# Patient Record
Sex: FEMALE | Race: WHITE | URBAN - METROPOLITAN AREA
[De-identification: names, ages, dates, MRNs, and addresses within clinical notes are randomized per-mention and may not be internally consistent; named-entity substitution may affect disease eponyms.]

---

## 2022-03-18 PROBLEM — G43.109 MIGRAINE WITH VERTIGO: Status: ACTIVE | Noted: 2021-02-23

## 2022-03-19 PROBLEM — G43.101 MIGRAINE WITH AURA AND WITH STATUS MIGRAINOSUS, NOT INTRACTABLE: Status: ACTIVE | Noted: 2021-02-23

## 2022-06-10 ENCOUNTER — OFFICE VISIT (OUTPATIENT)
Dept: NEUROLOGY | Age: 42
End: 2022-06-10
Payer: COMMERCIAL

## 2022-06-10 VITALS — HEART RATE: 80 BPM | DIASTOLIC BLOOD PRESSURE: 82 MMHG | SYSTOLIC BLOOD PRESSURE: 118 MMHG

## 2022-06-10 DIAGNOSIS — G43.101 MIGRAINE WITH AURA AND WITH STATUS MIGRAINOSUS, NOT INTRACTABLE: Primary | ICD-10-CM

## 2022-06-10 PROCEDURE — 64615 CHEMODENERV MUSC MIGRAINE: CPT | Performed by: PSYCHIATRY & NEUROLOGY

## 2022-06-10 PROCEDURE — 99212 OFFICE O/P EST SF 10 MIN: CPT | Performed by: PSYCHIATRY & NEUROLOGY

## 2022-06-10 NOTE — PROGRESS NOTES
6/10/2022  Deandre Loop 39 y.o. female      Chief Complaint:  Chief Complaint   Patient presents with    Follow-up    Migraine     Botox injections          History of Present Illness:  Botox treatment worked well down to 1 moderate headache last week only. Today will be her fourth treatment. Dominant HA bilateral parietal, trapezius tightness. Has Bruxism. Review Test Results: I have reviewed imaging study and lab tests, non relevant. Current Outpatient Medications   Medication Sig Dispense Refill    buPROPion (WELLBUTRIN XL) 150 MG extended release tablet TAKE 1 TABLET BY MOUTH EVERY DAY IN THE MORNING      Cholecalciferol 50 MCG (2000 UT) TABS Take by mouth      citalopram (CELEXA) 20 MG tablet TAKE 1 TABLET BY MOUTH EVERY DAY      Levonorgest-Eth Estrad 91-Day 0.15-0.03 &0.01 MG TABS Take by mouth      loratadine (CLARITIN) 10 MG tablet TAKE 1 TABLET BY MOUTH EVERY DAY       Current Facility-Administered Medications   Medication Dose Route Frequency Provider Last Rate Last Admin    Onabotulinumtoxin A (BOTOX (COSMETIC)) injection 200 Units  200 Units IntraMUSCular Once Franklin Pino MD            Allergies   Allergen Reactions    Ace Inhibitors          Review of Systems:  Review of Systems   Neurological: Positive for headaches. Examination:  Vitals:    06/10/22 0755   BP: 118/82   Site: Left Upper Arm   Position: Sitting   Pulse: 80             Neurologic Exam      Botox Procedure Note    Indication: Intractable migraines. Consent: Written consent obtained after the potential risks and benefits have been explained to the patient. Potential risks include:  Pain, bruising, bleeding, infection, flu-like symptoms, over-weakening of injected or adjacent muscles and swallowing dysfunction. Patient was given the botulinum toxin medication guide according to FDA standards. Technique: Botox 200 unit was dissolved into non-preservative normal saline 4 ml.  Gauge 30 facial needles were used for the injections. Procedure: Total 179 units were injected, 21 units discarded/ unavoidable. Five units/ 0.1 ml per site unless specified. Occipitalis R3 L3. Cervical paraspinal R2 L2. Trapezius 7 units R3 L3. Temporalis R4 L4. Frontalis R2 L2. Procerus 1. Corrugators 3.5 units/ 0.07 ml R1 L1. Additional 0. Masseter 7.5 units R1 L1. Patient tolerated the procedure well. She was instructed to call for any discomfort related to the injection. Assessment / Plan:    Sharron Adhikari was seen today for follow-up and migraine. Diagnoses and all orders for this visit:    Migraine with aura and with status migrainosus, not intractable  -     66273 - Chemodenervation of muscle(s): innervated by facial, trigeminal, cervical spinal and accessory nerves, bilateral  -     Onabotulinumtoxin A (BOTOX (COSMETIC)) injection 200 Units    Patient responded to Botox therapy very well for her migraines and bruxism. Continue current regimen. I have spent 35 min, greater than 50% of discussing and counseling with patient, for treatment and diagnostic plan review.        Botox 200 units  NDC: 4508-7154-97  Lot: [id number]  Exp: 01/2025    Sodium Chloride 0.9%  Lot # [id number]  Exp: 11/01/2023  NDC: 9932-0807-66

## 2022-09-02 ENCOUNTER — OFFICE VISIT (OUTPATIENT)
Dept: NEUROLOGY | Age: 42
End: 2022-09-02
Payer: COMMERCIAL

## 2022-09-02 VITALS — WEIGHT: 147 LBS | DIASTOLIC BLOOD PRESSURE: 86 MMHG | HEART RATE: 69 BPM | SYSTOLIC BLOOD PRESSURE: 131 MMHG

## 2022-09-02 DIAGNOSIS — G43.711 INTRACTABLE CHRONIC MIGRAINE WITHOUT AURA AND WITH STATUS MIGRAINOSUS: ICD-10-CM

## 2022-09-02 DIAGNOSIS — G43.101 MIGRAINE WITH AURA AND WITH STATUS MIGRAINOSUS, NOT INTRACTABLE: Primary | ICD-10-CM

## 2022-09-02 PROBLEM — G43.111 INTRACTABLE MIGRAINE WITH AURA WITH STATUS MIGRAINOSUS: Status: ACTIVE | Noted: 2021-02-23

## 2022-09-02 PROCEDURE — 99214 OFFICE O/P EST MOD 30 MIN: CPT | Performed by: PSYCHIATRY & NEUROLOGY

## 2022-09-02 PROCEDURE — 64615 CHEMODENERV MUSC MIGRAINE: CPT | Performed by: PSYCHIATRY & NEUROLOGY

## 2022-09-02 NOTE — PROGRESS NOTES
9/2/2022  Avis Zee 43 y.o. female      Chief Complaint:  Chief Complaint   Patient presents with    Migraine     botox    Follow-up              History of Present Illness:  Botox treatment worked well down to 1 headaches per month but last week migraine increased. Today will be her fifth treatment. Dominant HA bilateral parietal, trapezius tightness. Has Bruxism. Review Test Results: I have reviewed imaging study and lab tests, none relevant. Current Outpatient Medications   Medication Sig Dispense Refill    Onabotulinumtoxin A (BOTOX, COSMETIC,) 200 units injection 200 units IntraMUSCular q 3 months 1 each 3    buPROPion (WELLBUTRIN XL) 150 MG extended release tablet TAKE 1 TABLET BY MOUTH EVERY DAY IN THE MORNING      Cholecalciferol 50 MCG (2000 UT) TABS Take by mouth      citalopram (CELEXA) 20 MG tablet TAKE 1 TABLET BY MOUTH EVERY DAY      Levonorgest-Eth Estrad 91-Day 0.15-0.03 &0.01 MG TABS Take by mouth      loratadine (CLARITIN) 10 MG tablet TAKE 1 TABLET BY MOUTH EVERY DAY       No current facility-administered medications for this visit. Allergies   Allergen Reactions    Ace Inhibitors          Review of Systems:  Review of Systems   HENT:          Clenching teeth   Neurological:  Positive for headaches. Examination:  Vitals:    09/02/22 1350   BP: 131/86   Pulse: 69   Weight: 147 lb (66.7 kg)        Physical Exam  Vitals reviewed. Constitutional:       Appearance: Normal appearance. She is normal weight. HENT:      Head: Normocephalic. Eyes:      Extraocular Movements: Extraocular movements intact. Conjunctiva/sclera: Conjunctivae normal.      Pupils: Pupils are equal, round, and reactive to light. Cardiovascular:      Rate and Rhythm: Normal rate. Pulmonary:      Effort: Pulmonary effort is normal.   Musculoskeletal:         General: Normal range of motion. Cervical back: Normal range of motion. Skin:     General: Skin is warm and dry.    Neurological: General: No focal deficit present. Mental Status: She is alert and oriented to person, place, and time. Mental status is at baseline. Cranial Nerves: No cranial nerve deficit. Sensory: No sensory deficit. Motor: No weakness. Coordination: Coordination normal.      Gait: Gait normal.   Psychiatric:         Mood and Affect: Mood normal.         Behavior: Behavior normal.         Thought Content: Thought content normal.         Judgment: Judgment normal.        Neurologic Exam     Mental Status   Oriented to person, place, and time. Cranial Nerves     CN III, IV, VI   Pupils are equal, round, and reactive to light. Botox Procedure Note    Indication: Intractable migraines. Consent: Written consent obtained after the potential risks and benefits have been explained to the patient. Potential risks include:  Pain, bruising, bleeding, infection, flu-like symptoms, over-weakening of injected or adjacent muscles and swallowing dysfunction. Patient was given the botulinum toxin medication guide according to FDA standards. Technique: Botox A 200 unit was dissolved into non-preservative normal saline 4 ml. Gauge 30 facial needles were used for the injection. Procedure: Total 179 units were injected, 21 units discarded/ unavoidable. Five units/ 0.1 ml per site unless specified. Occipitalis R3 L3. Cervical paraspinal R2 L2. Trapezius 7 units R3 L3. Temporalis R4 L4. Frontalis R2 L2. Procerus 1. Corrugators 3.5 units/ 0.07 ml R1 L1. Additional 0. Masseter 7.5 units R1 L1. Patient tolerated the procedure well. She was instructed to call for any discomfort related to the injection. Assessment / Plan:    Dilcia Piper was seen today for migraine and follow-up.     Diagnoses and all orders for this visit:    Migraine with aura and with status migrainosus, not intractable  -     Onabotulinumtoxin A (BOTOX (COSMETIC)) injection 200 Units    Intractable chronic migraine without aura and with status migrainosus  -     Onabotulinumtoxin A (BOTOX, COSMETIC,) 200 units injection; 200 units IntraMUSCular q 3 months  -     36054 - Chemodenervation of muscle(s): innervated by facial, trigeminal, cervical spinal and accessory nerves, bilateral     Patient has been doing very well with Botox therapy, taking much less ibuprofen. Continue current regimen. I have spent 30 min, greater than 50% of discussing and counseling with patient, for treatment and diagnostic plan review.

## 2022-12-02 ENCOUNTER — OFFICE VISIT (OUTPATIENT)
Dept: NEUROLOGY | Age: 42
End: 2022-12-02

## 2022-12-02 VITALS — DIASTOLIC BLOOD PRESSURE: 83 MMHG | SYSTOLIC BLOOD PRESSURE: 126 MMHG | HEART RATE: 72 BPM

## 2022-12-02 DIAGNOSIS — G43.711 INTRACTABLE CHRONIC MIGRAINE WITHOUT AURA AND WITH STATUS MIGRAINOSUS: Primary | ICD-10-CM

## 2022-12-02 ASSESSMENT — ENCOUNTER SYMPTOMS: PHOTOPHOBIA: 1

## 2022-12-02 NOTE — LETTER
Informed Consent for Botulinumtoxina  (Botox)                                                                         [facility]#[id number]    Patient Name: Humberto Siu             :_1980      I give consent for the following procedure(s)    Botulinumtoxina (Botox) injections to be performed by:    []   Dr. Sandy Rivera    []   Dr. Oniel Dean    [x]   Dr. Sukumar Juarez    I understand that my provider will have the main responsibility for my care specific to procedure. I understand the the doctor may be fellows, residents or other qualified first assistant's may be involved in my procedure under the supervision of the above provider. My provider has explained to me my condition, the nature and purpose of each procedure, and the risks and types of complications involved, the potential benefits and drawbacks, potential problems related to recovery, the likelihood of success, the possible results of non-treatment,and the reasonable options or alternatives. I have had the chance to ask questions about the planned procedure and all my questions have been answered to my satisfaction. I have received no promises from anyone about the results that may be obtained from the procedure. All procedures involve risk. Routine risks include, but are not limited to, perforation and/or injury to nearby blood vessels, nerve and/or organs, infection, blood clots. Other risks may include paralysis and/or _____________________________________. I understand that the information I have received about the risks may not be fully completed and other less common risks.       ________________________________________Date:_______________  Patient Signature    ________________________________________Date:_______________  Provider Signature                         _______________________________________ Date:_______________             Witness Signature

## 2022-12-02 NOTE — PROGRESS NOTES
12/2/2022  Jaylon Nguyen 43 y.o. female      Chief Complaint:  Chief Complaint   Patient presents with    Migraine     Botox          History of Present Illness:  Botox treatment worked well down to 0-1 headaches per month. Today will be her sixth treatment. Dominant HA bilateral parietal, trapezius tightness. Has Bruxism. Review Test Results: I have reviewed imaging study and lab tests, non relevant recently. Current Outpatient Medications   Medication Sig Dispense Refill    Onabotulinumtoxin A (BOTOX, COSMETIC,) 200 units injection 200 units IntraMUSCular q 3 months 1 each 3    buPROPion (WELLBUTRIN XL) 150 MG extended release tablet TAKE 1 TABLET BY MOUTH EVERY DAY IN THE MORNING      Cholecalciferol 50 MCG (2000 UT) TABS Take by mouth      citalopram (CELEXA) 20 MG tablet TAKE 1 TABLET BY MOUTH EVERY DAY      Levonorgest-Eth Estrad 91-Day 0.15-0.03 &0.01 MG TABS Take by mouth      loratadine (CLARITIN) 10 MG tablet TAKE 1 TABLET BY MOUTH EVERY DAY       No current facility-administered medications for this visit. Allergies   Allergen Reactions    Ace Inhibitors          Review of Systems:  Review of Systems   Eyes:  Positive for photophobia. Neurological:  Positive for headaches. Examination:  Vitals:    12/02/22 0939   BP: 126/83   Site: Left Upper Arm   Position: Sitting   Pulse: 72        Physical Exam  Vitals reviewed. Constitutional:       Appearance: She is normal weight. Eyes:      Extraocular Movements: Extraocular movements intact. Conjunctiva/sclera: Conjunctivae normal.      Pupils: Pupils are equal, round, and reactive to light. Cardiovascular:      Rate and Rhythm: Normal rate. Pulmonary:      Effort: Pulmonary effort is normal.   Musculoskeletal:         General: Normal range of motion. Cervical back: Normal range of motion. Skin:     General: Skin is warm and dry. Neurological:      General: No focal deficit present.       Mental Status: She is alert and oriented to person, place, and time. Mental status is at baseline. Sensory: No sensory deficit. Motor: No weakness. Coordination: Coordination normal.      Gait: Gait normal.   Psychiatric:         Mood and Affect: Mood normal.         Behavior: Behavior normal.         Thought Content: Thought content normal.         Judgment: Judgment normal.        Neurologic Exam     Mental Status   Oriented to person, place, and time. Cranial Nerves     CN III, IV, VI   Pupils are equal, round, and reactive to light. Botox Procedure Note    Indication: Intractable migraines. Consent: Written consent obtained after the potential risks and benefits have been explained to the patient. Potential risks include:  Pain, bruising, bleeding, infection, flu-like symptoms, over-weakening of injected or adjacent muscles and swallowing dysfunction. Patient was given the botulinum toxin medication guide according to FDA standards. Technique: Botox A 200 unit was dissolved into non-preservative normal saline 4 ml. Gauge 30 facial needles were used for the injection. Procedure: Total 179 units were injected, 21 units discarded/ unavoidable. Five units/ 0.1 ml per site unless specified. Occipitalis R3 L3. Cervical paraspinal R2 L2. Trapezius 7 units R3 L3. Temporalis R4 L4. Frontalis R2 L2. Procerus 1. Corrugators 3.5 units/ 0.07 ml R1 L1. Additional 0. Masseter 7.5 units R1 L1. Patient tolerated the procedure well. She was instructed to call for any discomfort related to the injection. Assessment / Plan:    Sofia was seen today for migraine.     Diagnoses and all orders for this visit:    Intractable chronic migraine without aura and with status migrainosus  -     Onabotulinumtoxin A (BOTOX) injection 200 Units  -     83376 - Chemodenervation of muscle(s): innervated by facial, trigeminal, cervical spinal and accessory nerves, bilateral     Patient has responded to Botox therapy very well.  Continue current regimen. I have spent 30 min, greater than 50% of discussing and counseling with patient, for treatment and diagnostic plan review.

## 2023-02-24 ENCOUNTER — OFFICE VISIT (OUTPATIENT)
Dept: NEUROLOGY | Age: 43
End: 2023-02-24
Payer: COMMERCIAL

## 2023-02-24 VITALS — DIASTOLIC BLOOD PRESSURE: 78 MMHG | SYSTOLIC BLOOD PRESSURE: 123 MMHG | OXYGEN SATURATION: 96 % | HEART RATE: 68 BPM

## 2023-02-24 DIAGNOSIS — G43.711 INTRACTABLE CHRONIC MIGRAINE WITHOUT AURA AND WITH STATUS MIGRAINOSUS: Primary | ICD-10-CM

## 2023-02-24 PROCEDURE — 96372 THER/PROPH/DIAG INJ SC/IM: CPT | Performed by: PSYCHIATRY & NEUROLOGY

## 2023-02-24 PROCEDURE — 99212 OFFICE O/P EST SF 10 MIN: CPT | Performed by: PSYCHIATRY & NEUROLOGY

## 2023-02-24 PROCEDURE — 64615 CHEMODENERV MUSC MIGRAINE: CPT | Performed by: PSYCHIATRY & NEUROLOGY

## 2023-02-24 NOTE — PROGRESS NOTES
2/24/2023  Nicolasa Cassette 43 y.o. female      Chief Complaint:  Chief Complaint   Patient presents with    Follow-up     Botox injection    Migraine    Procedure          History of Present Illness:  Botox treatment worked well down to 0-1 headaches per month. Today will be her seventh treatment. Regular exercise. Previous Dominant HA bilateral parietal, trapezius tightness. Has Bruxism. Review Test Results: I have reviewed imaging study and lab tests, none relevant. Current Outpatient Medications   Medication Sig Dispense Refill    Onabotulinumtoxin A (BOTOX, COSMETIC,) 200 units injection 200 units IntraMUSCular q 3 months 1 each 3    buPROPion (WELLBUTRIN XL) 150 MG extended release tablet TAKE 1 TABLET BY MOUTH EVERY DAY IN THE MORNING      Cholecalciferol 50 MCG (2000 UT) TABS Take by mouth      citalopram (CELEXA) 20 MG tablet TAKE 1 TABLET BY MOUTH EVERY DAY      Levonorgest-Eth Estrad 91-Day 0.15-0.03 &0.01 MG TABS Take by mouth      loratadine (CLARITIN) 10 MG tablet TAKE 1 TABLET BY MOUTH EVERY DAY       No current facility-administered medications for this visit. Allergies   Allergen Reactions    Ace Inhibitors          Review of Systems:  Review of Systems   Neurological:  Positive for headaches. Examination:  Vitals:    02/24/23 1337   BP: 123/78   Pulse: 68   SpO2: 96%        Physical Exam  Vitals reviewed. Constitutional:       Appearance: She is normal weight. Eyes:      Extraocular Movements: Extraocular movements intact. Conjunctiva/sclera: Conjunctivae normal.      Pupils: Pupils are equal, round, and reactive to light. Cardiovascular:      Rate and Rhythm: Normal rate. Pulmonary:      Effort: Pulmonary effort is normal.   Musculoskeletal:         General: Normal range of motion. Cervical back: Normal range of motion. Skin:     General: Skin is warm and dry. Neurological:      General: No focal deficit present.       Mental Status: She is alert and oriented to person, place, and time. Mental status is at baseline. Cranial Nerves: No cranial nerve deficit. Sensory: No sensory deficit. Motor: No weakness. Coordination: Coordination normal.      Gait: Gait normal.   Psychiatric:         Mood and Affect: Mood normal.         Behavior: Behavior normal.         Thought Content: Thought content normal.         Judgment: Judgment normal.        Neurologic Exam     Mental Status   Oriented to person, place, and time. Cranial Nerves     CN III, IV, VI   Pupils are equal, round, and reactive to light. Botox Procedure Note    Indication: Intractable migraines. Consent: Written consent obtained after the potential risks and benefits have been explained to the patient. Potential risks include:  Pain, bruising, bleeding, infection, flu-like symptoms, over-weakening of injected or adjacent muscles and swallowing dysfunction. Patient was given the botulinum toxin medication guide according to FDA standards. Technique: Botox A 200 unit was dissolved into non-preservative normal saline 4 ml. Gauge 30 facial needles were used for the injection. Procedure: Total 179 units were injected, 21 units discarded/ unavoidable. Five units/ 0.1 ml per site unless specified. Occipitalis R3 L3. Cervical paraspinal R2 L2. Trapezius 7 units R3 L3. Temporalis R4 L4. Frontalis R2 L2. Procerus 1. Corrugators 3.5 units/ 0.07 ml R1 L1. Additional 0. Masseter 7.5 units R1 L1. Patient tolerated the procedure well. She was instructed to call for any discomfort related to the injection. Assessment / Plan:    Nery Chavez was seen today for follow-up, migraine and procedure.     Diagnoses and all orders for this visit:    Intractable chronic migraine without aura and with status migrainosus  -     Onabotulinumtoxin A (BOTOX) injection 200 Units  -     59578 - Chemodenervation of muscle(s): innervated by facial, trigeminal, cervical spinal and accessory nerves, bilateral     Responded to Botox therapy very well for chronic migraine and bruxism. Continue current regimen. I have spent 30 min, greater than 50% of discussing and counseling with patient, for treatment and diagnostic plan review.

## 2023-02-24 NOTE — LETTER
Informed Consent for Botulinumtoxina  (Botox)                                                                  [facility]#_[id number]    Patient Name: Fara East             :_1980    I give consent for the following procedure(s)  Botulinumtoxina (Botox) injections to be performed by:    []   Dr. Paty Anthony    []   Nicki Simon NP    []   Dr. Alicia Levine    [x]   Dr. Daxa Roman    I understand that my provider will have the main responsibility for my care specific to procedure. I understand the the doctor may be fellows, residents or other qualified first assistant's may be involved in my procedure under the supervision of the above provider. My provider has explained to me my condition, the nature and purpose of each procedure, and the risks and types of complications involved, the potential benefits and drawbacks, potential problems related to recovery, the likelihood of success, the possible results of non-treatment,and the reasonable options or alternatives. I have had the chance to ask questions about the planned procedure and all my questions have been answered to my satisfaction. I have received no promises from anyone about the results that may be obtained from the procedure. All procedures involve risk. Routine risks include, but are not limited to, perforation and/or injury to nearby blood vessels, nerve and/or organs, infection, blood clots. Other risks may include paralysis and/or _____________________________________. I understand that the information I have received about the risks may not be fully completed and other less common risks.     ________________________________________Date:_______________  Patient Signature    ________________________________________Date:_______________  Provider Signature                         _______________________________________ Date:_______________             Witness Signature

## 2023-02-24 NOTE — PROGRESS NOTES
2/24/2023  April Still 42 y.o. female      Chief Complaint:  Chief Complaint   Patient presents with    Follow-up     Botox injection          History of Present Illness:  Botox treatment worked well down to 0-1 headaches per month. Today will be her seventh treatment.    Regular exercise. Previous Dominant HA bilateral parietal, trapezius tightness. Has Bruxism.    Review Test Results: I have reviewed imaging study and lab tests, none relevant.       Current Outpatient Medications   Medication Sig Dispense Refill    Onabotulinumtoxin A (BOTOX, COSMETIC,) 200 units injection 200 units IntraMUSCular q 3 months 1 each 3    buPROPion (WELLBUTRIN XL) 150 MG extended release tablet TAKE 1 TABLET BY MOUTH EVERY DAY IN THE MORNING      Cholecalciferol 50 MCG (2000 UT) TABS Take by mouth      citalopram (CELEXA) 20 MG tablet TAKE 1 TABLET BY MOUTH EVERY DAY      Levonorgest-Eth Estrad 91-Day 0.15-0.03 &0.01 MG TABS Take by mouth      loratadine (CLARITIN) 10 MG tablet TAKE 1 TABLET BY MOUTH EVERY DAY       No current facility-administered medications for this visit.        Allergies   Allergen Reactions    Ace Inhibitors          Review of Systems:  Review of Systems   Neurological:  Positive for headaches.       Examination:  Vitals:    02/24/23 1337   BP: 123/78   Pulse: 68   SpO2: 96%        Physical Exam  Vitals reviewed.   Constitutional:       Appearance: She is normal weight.   Eyes:      Extraocular Movements: Extraocular movements intact.      Conjunctiva/sclera: Conjunctivae normal.      Pupils: Pupils are equal, round, and reactive to light.   Cardiovascular:      Rate and Rhythm: Normal rate.   Pulmonary:      Effort: Pulmonary effort is normal.   Musculoskeletal:         General: Normal range of motion.      Cervical back: Normal range of motion.   Skin:     General: Skin is warm and dry.   Neurological:      General: No focal deficit present.      Mental Status: She is alert and oriented to person, place,  and time. Mental status is at baseline. Cranial Nerves: No cranial nerve deficit. Sensory: No sensory deficit. Motor: No weakness. Coordination: Coordination normal.      Gait: Gait normal.   Psychiatric:         Mood and Affect: Mood normal.         Behavior: Behavior normal.         Thought Content: Thought content normal.         Judgment: Judgment normal.        Neurologic Exam     Mental Status   Oriented to person, place, and time. Cranial Nerves     CN III, IV, VI   Pupils are equal, round, and reactive to light. Botox Procedure Note    Indication: Intractable migraines. Consent: Written consent obtained after the potential risks and benefits have been explained to the patient. Potential risks include:  Pain, bruising, bleeding, infection, flu-like symptoms, over-weakening of injected or adjacent muscles and swallowing dysfunction. Patient was given the botulinum toxin medication guide according to FDA standards. Technique: Botox A 200 unit was dissolved into non-preservative normal saline 4 ml. Gauge 30 facial needles were used for the injection. Procedure: Total 179 units were injected, 21 units discarded/ unavoidable. Five units/ 0.1 ml per site unless specified. Occipitalis R3 L3. Cervical paraspinal R2 L2. Trapezius 7 units R3 L3. Temporalis R4 L4. Frontalis R2 L2. Procerus 1. Corrugators 3.5 units/ 0.07 ml R1 L1. Additional 0. Masseter 7.5 units R1 L1. Patient tolerated the procedure well. She was instructed to call for any discomfort related to the injection. Assessment / Plan:    Warden Huerta was seen today for follow-up.     Diagnoses and all orders for this visit:    Intractable chronic migraine without aura and with status migrainosus  -     Onabotulinumtoxin A (BOTOX) injection 200 Units  -     43850 - Chemodenervation of muscle(s): innervated by facial, trigeminal, cervical spinal and accessory nerves, bilateral     Responded to Botox therapy very well for chronic migraine and bruxism. Continue current regimen. I have spent 30 min, greater than 50% of discussing and counseling with patient, for treatment and diagnostic plan review.

## 2023-05-22 ENCOUNTER — OFFICE VISIT (OUTPATIENT)
Dept: NEUROLOGY | Age: 43
End: 2023-05-22
Payer: COMMERCIAL

## 2023-05-22 VITALS — OXYGEN SATURATION: 98 % | HEART RATE: 84 BPM | SYSTOLIC BLOOD PRESSURE: 118 MMHG | DIASTOLIC BLOOD PRESSURE: 89 MMHG

## 2023-05-22 DIAGNOSIS — G43.711 INTRACTABLE CHRONIC MIGRAINE WITHOUT AURA AND WITH STATUS MIGRAINOSUS: Primary | ICD-10-CM

## 2023-05-22 PROCEDURE — 64615 CHEMODENERV MUSC MIGRAINE: CPT

## 2023-05-22 NOTE — PROGRESS NOTES
[facility], [address] [place] [address], [address] [place] [address]  Phone: [phone] Fax [phone]  KINJAL Mike          Patient: Madison Winn  Provider: KINJAL Mike       Procedure note: Botulinum Toxin injections     Indication: Chronic Migraine        Subjective:      Patient present for chemodenervation for chronic migraine. The patient received her last Botox injections in February 2023 with Dr. Suzanna Jordan. The patient was referred by Dr. Suzanna Jordan. These notes have been reviewed. The patient reports no more than 4-5 headache days since last treatment. The treatments have significantly reduced the frequency and severity of headaches. According to a review of the patient's medical record, this will the the patient's eighth treatment. She reports that her headaches are primarily located in bilateral parietal regions. Symptoms reported to be associated with headaches include nausea, photophobia, and phonophobia. In addition, tightness in bilateral trapezius muscles. Also treated for bruxism. The patient has previously tolerated the injections without difficulty or adverse effects. She continues to take OTC options for breakthrough headaches. Past medical history, surgical history, social history, family history, medications and allergies were all reviewed and updated as appropriate.       Outpatient Encounter Medications as of 5/22/2023   Medication Sig Dispense Refill    Onabotulinumtoxin A (BOTOX, COSMETIC,) 200 units injection 200 units IntraMUSCular q 3 months 1 each 3    buPROPion (WELLBUTRIN XL) 150 MG extended release tablet TAKE 1 TABLET BY MOUTH EVERY DAY IN THE MORNING      Cholecalciferol 50 MCG (2000 UT) TABS Take by mouth      citalopram (CELEXA) 20 MG tablet TAKE 1 TABLET BY MOUTH EVERY DAY      Levonorgest-Eth Estrad 91-Day 0.15-0.03 &0.01 MG TABS Take by mouth      loratadine (CLARITIN) 10 MG tablet TAKE 1 TABLET BY MOUTH EVERY DAY       No

## 2023-08-28 ENCOUNTER — OFFICE VISIT (OUTPATIENT)
Dept: NEUROLOGY | Age: 43
End: 2023-08-28
Payer: COMMERCIAL

## 2023-08-28 VITALS — DIASTOLIC BLOOD PRESSURE: 78 MMHG | HEART RATE: 68 BPM | SYSTOLIC BLOOD PRESSURE: 116 MMHG

## 2023-08-28 DIAGNOSIS — G43.711 INTRACTABLE CHRONIC MIGRAINE WITHOUT AURA AND WITH STATUS MIGRAINOSUS: Primary | ICD-10-CM

## 2023-08-28 PROCEDURE — 64615 CHEMODENERV MUSC MIGRAINE: CPT

## 2023-08-28 NOTE — PROGRESS NOTES
[facility]2020 [facility], [address]  Phone: [phone] Fax [phone]  KINJAL Robles          Patient: Karen Patterson  Provider: KINJAL Robles       Procedure note: Botulinum Toxin injections     Indication: Chronic Migraine        Subjective:      Patient present for chemodenervation for chronic migraine. The patient received her last Botox injections in May 2023. These notes have been reviewed. The patient was referred by Dr. Monique Valles. Moving forward she will follow Laura Quiroz NP for medication management. The treatments have significantly reduced the frequency and severity of headaches. According to a review of the patient's medical record, this will the the patient's 9th  treatment. She reports that her headaches are primarily located in bilateral parietal regions. Symptoms reported to be associated with headaches include nausea, photophobia, and phonophobia. In addition, tightness in bilateral trapezius muscles. Also treated for bruxism. The patient has previously tolerated the injections without difficulty or adverse effects. She continues to take OTC options for breakthrough headaches. Past medical history, surgical history, social history, family history, medications and allergies were all reviewed and updated as appropriate.       Outpatient Encounter Medications as of 2023   Medication Sig Dispense Refill    Onabotulinumtoxin A (BOTOX, COSMETIC,) 200 units injection 200 units IntraMUSCular q 3 months 1 each 3    buPROPion (WELLBUTRIN XL) 150 MG extended release tablet TAKE 1 TABLET BY MOUTH EVERY DAY IN THE MORNING      Cholecalciferol 50 MCG ( UT) TABS Take by mouth      citalopram (CELEXA) 20 MG tablet TAKE 1 TABLET BY MOUTH EVERY DAY      Levonorgest-Eth Estrad -Day 0.15-0.03 &0.01 MG TABS Take by mouth      loratadine (CLARITIN) 10 MG tablet TAKE 1 TABLET BY MOUTH EVERY DAY      [] Onabotulinumtoxin A

## 2023-11-20 ENCOUNTER — OFFICE VISIT (OUTPATIENT)
Dept: NEUROLOGY | Age: 43
End: 2023-11-20
Payer: COMMERCIAL

## 2023-11-20 VITALS — SYSTOLIC BLOOD PRESSURE: 136 MMHG | OXYGEN SATURATION: 95 % | HEART RATE: 68 BPM | DIASTOLIC BLOOD PRESSURE: 98 MMHG

## 2023-11-20 DIAGNOSIS — G43.711 INTRACTABLE CHRONIC MIGRAINE WITHOUT AURA AND WITH STATUS MIGRAINOSUS: Primary | ICD-10-CM

## 2023-11-20 PROCEDURE — 64615 CHEMODENERV MUSC MIGRAINE: CPT

## 2023-11-20 NOTE — PROGRESS NOTES
[facility]2020 e E, [address]  Phone: [phone] Fax [phone]  KINJAL Olmedo          Patient: Madhuri Brenner  Provider: KINJAL Olmedo       Procedure note: Botulinum Toxin injections     Indication: Chronic Migraine        Subjective:      Patient present for chemodenervation for chronic migraine. The patient received her last Botox injections in 2023. These notes have been reviewed. The treatments have significantly reduced the frequency and severity of headaches. According to a review of the patient's medical record, this will the the patient's 10th  treatment. She reports that her headaches are primarily located in bilateral parietal regions. Symptoms reported to be associated with headaches include nausea, photophobia, and phonophobia. In addition, tightness in bilateral trapezius muscles. Also treated for bruxism. The patient has previously tolerated the injections without difficulty or adverse effects. She continues to take OTC options for breakthrough headaches. Past medical history, surgical history, social history, family history, medications and allergies were all reviewed and updated as appropriate. Outpatient Encounter Medications as of 2023   Medication Sig Dispense Refill    Onabotulinumtoxin A (BOTOX, COSMETIC,) 200 units injection 200 units IntraMUSCular q 3 months 1 each 3    buPROPion (WELLBUTRIN XL) 150 MG extended release tablet TAKE 1 TABLET BY MOUTH EVERY DAY IN THE MORNING      Cholecalciferol 50 MCG ( UT) TABS Take by mouth      citalopram (CELEXA) 20 MG tablet TAKE 1 TABLET BY MOUTH EVERY DAY      Levonorgest-Eth Estrad -Day 0.15-0.03 &0.01 MG TABS Take by mouth      loratadine (CLARITIN) 10 MG tablet TAKE 1 TABLET BY MOUTH EVERY DAY      [] Onabotulinumtoxin A (BOTOX) injection 200 Units        No facility-administered encounter medications on file as of 2023.

## 2024-02-12 ENCOUNTER — OFFICE VISIT (OUTPATIENT)
Dept: NEUROLOGY | Age: 44
End: 2024-02-12
Payer: COMMERCIAL

## 2024-02-12 VITALS — SYSTOLIC BLOOD PRESSURE: 121 MMHG | OXYGEN SATURATION: 97 % | HEART RATE: 87 BPM | DIASTOLIC BLOOD PRESSURE: 80 MMHG

## 2024-02-12 DIAGNOSIS — G43.711 INTRACTABLE CHRONIC MIGRAINE WITHOUT AURA AND WITH STATUS MIGRAINOSUS: Primary | ICD-10-CM

## 2024-02-12 PROCEDURE — 64615 CHEMODENERV MUSC MIGRAINE: CPT

## 2024-02-12 NOTE — PROGRESS NOTES
[facility] NEUROLOG[facility]  [address] [place] [address], [address]  [place] [address]  Phone: [phone] Fax [phone]  KINJAL Guardado          Patient: April Still  Provider: KINJAL Guardado       Procedure note:  Botulinum Toxin injections     Indication: Chronic Migraine        Subjective:      Patient present for chemodenervation for chronic migraine. The patient received her last Botox injections in 2023. These notes have been reviewed.     The treatments have significantly reduced the frequency and severity of headaches.The patient has previously tolerated the injections without difficulty or adverse effects.    She reports that her headaches are primarily located in bilateral parietal regions. Symptoms reported to be associated with headaches include nausea, photophobia, and phonophobia. In addition, tightness in bilateral trapezius muscles. Also treated for bruxism.    She continues to take OTC options for breakthrough headaches.        Past medical history, surgical history, social history, family history, medications and allergies were all reviewed and updated as appropriate.      Outpatient Encounter Medications as of 2024   Medication Sig Dispense Refill    Onabotulinumtoxin A (BOTOX, COSMETIC,) 200 units injection 200 units IntraMUSCular q 3 months 1 each 3    buPROPion (WELLBUTRIN XL) 150 MG extended release tablet TAKE 1 TABLET BY MOUTH EVERY DAY IN THE MORNING      Cholecalciferol 50 MCG ( UT) TABS Take by mouth      citalopram (CELEXA) 20 MG tablet TAKE 1 TABLET BY MOUTH EVERY DAY      Levonorgest-Eth Estrad -Day 0.15-0.03 &0.01 MG TABS Take by mouth      loratadine (CLARITIN) 10 MG tablet TAKE 1 TABLET BY MOUTH EVERY DAY      [] Onabotulinumtoxin A (BOTOX) injection 200 Units        No facility-administered encounter medications on file as of 2024.           Exam:      Visit Vitals  Vitals:    24 1326   BP: 121/80   Pulse: 87   SpO2: 97%

## 2024-05-05 NOTE — PROGRESS NOTES
Corrugators (3.5 units) R1 L1.    Masseter R1 L1.  Lateral Pterygoid R1 L1.           Total injected: 187 BOTOX 100units/2 cc.   Waste: 13 units     Comments: There were no complications.  The patient tolerated the procedure well.        Signature: Annabelle Parrish NP-BELLE    Date: 05/06/24    [address], [address] [place] [address]  Ph: [phone]  Fax: [phone]       I have personally interviewed and examined April Still and I have personally reviewed all relevant records including labs and imaging as noted above. I have written all aspects of this note. More than 50% of this time was used for counseling regarding my diagnosis, prognosis, and plans for management.   Procedure Time: 25 minutes  Total visit time (including procedures): 30 minutes

## 2024-05-06 ENCOUNTER — OFFICE VISIT (OUTPATIENT)
Dept: NEUROLOGY | Age: 44
End: 2024-05-06

## 2024-05-06 VITALS — DIASTOLIC BLOOD PRESSURE: 80 MMHG | OXYGEN SATURATION: 98 % | SYSTOLIC BLOOD PRESSURE: 128 MMHG | HEART RATE: 89 BPM

## 2024-05-06 DIAGNOSIS — G43.711 INTRACTABLE CHRONIC MIGRAINE WITHOUT AURA AND WITH STATUS MIGRAINOSUS: Primary | ICD-10-CM

## 2024-07-28 NOTE — PROGRESS NOTES
[facility] NEUROLOGY  [address] [place] [address], [address]  [place] [address]  Phone: [phone] Fax [phone]  KINJAL Guardado          Patient: April Still  Provider: KINJAL Guardado       Procedure note:  Botulinum Toxin injections     Indication: Chronic Migraine        Subjective:      Patient present for chemodenervation for chronic migraine. The patient received her last Botox injections in May 2024. These notes have been reviewed. Has not established with provider for medication management-discussed.    The treatments have significantly reduced the frequency and severity of headaches.The patient has previously tolerated the injections without difficulty or adverse effects.    She reports that her headaches are primarily located in bilateral parietal regions. Symptoms reported to be associated with headaches include nausea, photophobia, and phonophobia. In addition, tightness in bilateral trapezius muscles. Also treated for bruxism.    She continues to take OTC options for breakthrough headaches.        Past medical history, surgical history, social history, family history, medications and allergies were all reviewed and updated as appropriate.      Outpatient Encounter Medications as of 2024   Medication Sig Dispense Refill    Onabotulinumtoxin A (BOTOX, COSMETIC,) 200 units injection 200 units IntraMUSCular q 3 months 1 each 3    buPROPion (WELLBUTRIN XL) 150 MG extended release tablet TAKE 1 TABLET BY MOUTH EVERY DAY IN THE MORNING (Patient not taking: Reported on 2024)      Cholecalciferol 50 MCG (2000) TABS Take by mouth      citalopram (CELEXA) 20 MG tablet TAKE 1 TABLET BY MOUTH EVERY DAY      Levonorgest-Eth Estrad -Day 0.15-0.03 &0.01 MG TABS Take by mouth      loratadine (CLARITIN) 10 MG tablet TAKE 1 TABLET BY MOUTH EVERY DAY      [] Onabotulinumtoxin A (BOTOX) injection 200 Units        No facility-administered encounter medications on file as of

## 2024-07-29 ENCOUNTER — OFFICE VISIT (OUTPATIENT)
Dept: NEUROLOGY | Age: 44
End: 2024-07-29
Payer: COMMERCIAL

## 2024-07-29 VITALS — SYSTOLIC BLOOD PRESSURE: 121 MMHG | OXYGEN SATURATION: 96 % | HEART RATE: 71 BPM | DIASTOLIC BLOOD PRESSURE: 75 MMHG

## 2024-07-29 DIAGNOSIS — G43.711 INTRACTABLE CHRONIC MIGRAINE WITHOUT AURA AND WITH STATUS MIGRAINOSUS: Primary | ICD-10-CM

## 2024-07-29 PROCEDURE — 64615 CHEMODENERV MUSC MIGRAINE: CPT

## 2024-10-25 ENCOUNTER — OFFICE VISIT (OUTPATIENT)
Dept: NEUROLOGY | Age: 44
End: 2024-10-25

## 2024-10-25 VITALS — HEART RATE: 76 BPM | OXYGEN SATURATION: 96 % | SYSTOLIC BLOOD PRESSURE: 115 MMHG | DIASTOLIC BLOOD PRESSURE: 80 MMHG

## 2024-10-25 DIAGNOSIS — F45.8 BRUXISM: ICD-10-CM

## 2024-10-25 DIAGNOSIS — G43.711 INTRACTABLE CHRONIC MIGRAINE WITHOUT AURA AND WITH STATUS MIGRAINOSUS: Primary | ICD-10-CM

## 2024-10-25 NOTE — PROGRESS NOTES
[facility]  [address] [place] [address], [address]  [place] [address]  Phone: [phone] Fax [phone]       Botulinum Toxin Injection Procedure Note    Pre-procedure Diagnosis: Chronic migraine headaches, bruxism      Patient presents for chemodenervation for chronic migraine. She received treatment for about 2 years first with Dr. CAMILO then with Oumou Parrish NP.   Last treatment in July 2024.     Denies side effects and has had a decrease in migraine frequency and intensity    Indications: same  Improving with decreased intensity of migraines.       Past medical history, surgical history, social history, family history, medications and allergies were all reviewed and updated as appropriate.     Procedure Details   Consent: Written consent obtained after the potential risks and benefits have been explained to the patient.  Potential risks include:  Pain, bruising, bleeding, infection, flu-like symptoms, over-weakening of injected or adjacent muscles and swallowing dysfunction. Patient was given the botulinum toxin medication guide according to FDA standards.    Technique: Botox A 200 unit was dissolved into non-preservative normal saline 4 ml. Gauge 30 facial needles were used for the injection.     Bilateral Corrugators: 5 units each side (total 10 units)  Procerus: 5 units  Bilateral frontalis: 5 units at 4 sites ( total 20 units)   Bilateral temporalis: 20 units divided into 2 sites each side (total 40 units)  Bilateral occipitalis: 15 units divided into 2 sites each side (total 30 units)  Bilateral cervical paraspinals: 10 units divided into 2 ites each side (total 20 units)   Bilateral trapezius: 15 units divided into 3 sites, each side ( total 30 units)     Total injected: 165 BOTOX 100units/2 cc.   Waste: 35 units     Comments: There were no complications.  The patient tolerated the procedure well.     - RTC 3 months for review and re-injection.      Signature: MD Nat Weller

## 2025-01-21 ENCOUNTER — OFFICE VISIT (OUTPATIENT)
Dept: NEUROLOGY | Age: 45
End: 2025-01-21
Payer: COMMERCIAL

## 2025-01-21 VITALS — HEART RATE: 75 BPM | DIASTOLIC BLOOD PRESSURE: 80 MMHG | SYSTOLIC BLOOD PRESSURE: 118 MMHG

## 2025-01-21 DIAGNOSIS — G43.711 INTRACTABLE CHRONIC MIGRAINE WITHOUT AURA AND WITH STATUS MIGRAINOSUS: Primary | ICD-10-CM

## 2025-01-21 PROCEDURE — 64615 CHEMODENERV MUSC MIGRAINE: CPT | Performed by: PSYCHIATRY & NEUROLOGY

## 2025-01-21 NOTE — PROGRESS NOTES
[facility]  [address] [place] [address], [address]  [place] [address]  Phone: [phone] Fax [phone]       Botulinum Toxin Injection Procedure Note    Pre-procedure Diagnosis: Chronic migraine headaches     Patient presents for chemodenervation for chronic migraine.   She received treatment for about 2 years first with Dr. CAMILO then with Oumou Parrish NP  then myself.  Last treatment in October 2024. Tolerates the procedure well.   Denies side effects and has had a decrease in migraine frequency and intensity    Indications: same  Improving with decreased intensity of migraines.       Past medical history, surgical history, social history, family history, medications and allergies were all reviewed and updated as appropriate.     Procedure Details   Consent: Written consent obtained after the potential risks and benefits have been explained to the patient.  Potential risks include:  Pain, bruising, bleeding, infection, flu-like symptoms, over-weakening of injected or adjacent muscles and swallowing dysfunction. Patient was given the botulinum toxin medication guide according to FDA standards.    Technique: Botox A 200 unit was dissolved into non-preservative normal saline 4 ml. Gauge 30 facial needles were used for the injection.     Bilateral Corrugators: 5 units each side (total 10 units)  Procerus: 5 units  Bilateral frontalis: 5 units at 4 sites ( total 20 units)   Bilateral temporalis: 20 units divided into 2 sites each side (total 40 units)  Bilateral occipitalis: 15 units divided into 2 sites each side (total 30 units)  Bilateral cervical paraspinals: 10 units divided into 2 ites each side (total 20 units)   Bilateral trapezius: 15 units divided into 3 sites, each side ( total 30 units)  Bilateral masseter 5 units each side   Total injected: 165 BOTOX 100units/2 cc.   Waste: 35 units     Comments: There were no complications.  The patient tolerated the procedure well.     - RTC 3 months for review

## 2025-04-25 ENCOUNTER — OFFICE VISIT (OUTPATIENT)
Dept: NEUROLOGY | Age: 45
End: 2025-04-25

## 2025-04-25 VITALS — SYSTOLIC BLOOD PRESSURE: 115 MMHG | DIASTOLIC BLOOD PRESSURE: 78 MMHG | HEART RATE: 71 BPM | OXYGEN SATURATION: 96 %

## 2025-04-25 DIAGNOSIS — G43.711 INTRACTABLE CHRONIC MIGRAINE WITHOUT AURA AND WITH STATUS MIGRAINOSUS: Primary | ICD-10-CM

## 2025-04-25 NOTE — PROGRESS NOTES
[facility] NEUROLOGY  [address] [place] [address], [address]  [place] [address]  Phone: [phone] Fax [phone]  Anita Aguilar FNP-C     Patient:  April Still   Provider: AVIVA Garza - NP      Procedure note:  Botulinum Toxin injections     Indication: Chronic Migraine        Subjective:      Patient presents for chemodenervation for chronic migraine. She has previously received botox from Oumou Parrish NP and most recently Dr. Dahl on 1/21/2025.       With regular botox, rarely has headaches. Headaches are located frontal or occipital. She will take 800mg ibuprofen and rest. She can tell when her botox is wearing off. This has been the most effective treatment for her migraines. She does not have a true rescue medication. Has upcoming appt with me on 5/22 for medication management.        Past medical history, surgical history, social history, family history, medications and allergies were all reviewed and updated as appropriate.      Outpatient Encounter Medications as of 4/25/2025   Medication Sig Dispense Refill    Onabotulinumtoxin A (BOTOX, COSMETIC,) 200 units injection 200 units IntraMUSCular q 3 months 1 each 3    buPROPion (WELLBUTRIN XL) 150 MG extended release tablet TAKE 1 TABLET BY MOUTH EVERY DAY IN THE MORNING (Patient not taking: Reported on 5/6/2024)      Cholecalciferol 50 MCG (2000 UT) TABS Take by mouth      citalopram (CELEXA) 20 MG tablet TAKE 1 TABLET BY MOUTH EVERY DAY      Levonorgest-Eth Estrad 91-Day 0.15-0.03 &0.01 MG TABS Take by mouth      loratadine (CLARITIN) 10 MG tablet TAKE 1 TABLET BY MOUTH EVERY DAY       No facility-administered encounter medications on file as of 4/25/2025.           Exam:      Visit Vitals  Vitals:    04/25/25 1329   BP: 115/78   Pulse: 71   SpO2: 96%          General Exam:  General - Well developed, well nourished, in no apparent distress.   HENT - Normocephalic, atraumatic. Oropharynx clear.   Eyes - Sclera anicteric.  External Ears -

## 2025-07-22 ENCOUNTER — OFFICE VISIT (OUTPATIENT)
Dept: NEUROLOGY | Age: 45
End: 2025-07-22
Payer: COMMERCIAL

## 2025-07-22 VITALS — DIASTOLIC BLOOD PRESSURE: 87 MMHG | SYSTOLIC BLOOD PRESSURE: 130 MMHG

## 2025-07-22 DIAGNOSIS — G43.711 INTRACTABLE CHRONIC MIGRAINE WITHOUT AURA AND WITH STATUS MIGRAINOSUS: Primary | ICD-10-CM

## 2025-07-22 PROCEDURE — 64615 CHEMODENERV MUSC MIGRAINE: CPT | Performed by: PSYCHIATRY & NEUROLOGY

## 2025-07-22 NOTE — PROGRESS NOTES
[facility]  [address] [place] [address], [address]  [place] [address]  Phone: [phone] Fax [phone]       Botulinum Toxin Injection Procedure Note    Pre-procedure Diagnosis: Chronic migraine headaches     Patient presents for chemodenervation for chronic migraine.   Responds to Botox dramatically with an improvement. Typical has wearing off effect about 2-3 weeks prior to her injection.   Denies side effects and has had a decrease in migraine frequency and intensity    Indications: same  Improving with decreased intensity of migraines.       Past medical history, surgical history, social history, family history, medications and allergies were all reviewed and updated as appropriate.     Procedure Details   Consent: Written consent obtained after the potential risks and benefits have been explained to the patient.  Potential risks include:  Pain, bruising, bleeding, infection, flu-like symptoms, over-weakening of injected or adjacent muscles and swallowing dysfunction. Patient was given the botulinum toxin medication guide according to FDA standards.    Technique: Botox A 200 unit was dissolved into non-preservative normal saline 4 ml. Gauge 30 facial needles were used for the injection.     Bilateral Corrugators: 5 units each side (total 10 units)  Procerus: 5 units  Bilateral frontalis: 5 units at 4 sites ( total 20 units)   Bilateral temporalis: 20 units divided into 2 sites each side (total 40 units)  Bilateral occipitalis: 15 units divided into 2 sites each side (total 30 units)  Bilateral cervical paraspinals: 10 units divided into 2 ites each side (total 20 units)   Bilateral trapezius: 15 units divided into 3 sites, each side ( total 30 units)  Bilateral masseter 5 units each side   Total injected: 165 BOTOX 100units/2 cc.   Waste: 35 units     Comments: There were no complications.  The patient tolerated the procedure well.     - RTC 3 months for review and re-injection.      Signature: